# Patient Record
Sex: MALE
[De-identification: names, ages, dates, MRNs, and addresses within clinical notes are randomized per-mention and may not be internally consistent; named-entity substitution may affect disease eponyms.]

---

## 2018-10-16 ENCOUNTER — HOSPITAL ENCOUNTER (OUTPATIENT)
Dept: HOSPITAL 88 - OR | Age: 63
Discharge: HOME | End: 2018-10-16
Attending: OPHTHALMOLOGY
Payer: COMMERCIAL

## 2018-10-16 VITALS — SYSTOLIC BLOOD PRESSURE: 125 MMHG | DIASTOLIC BLOOD PRESSURE: 75 MMHG

## 2018-10-16 DIAGNOSIS — H11.052: Primary | ICD-10-CM

## 2018-10-16 PROCEDURE — 65426 REMOVAL OF EYE LESION: CPT

## 2018-10-16 PROCEDURE — 88305 TISSUE EXAM BY PATHOLOGIST: CPT

## 2018-10-18 NOTE — OPERATIVE REPORT
DATE OF PROCEDURE:  October 16, 2018 

 

PREOPERATIVE DIAGNOSES

1. 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

2. 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



POSTOPERATIVE DIAGNOSES

1. 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

1. 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



OPERATIONS PERFORMED

1. Pterygium excision left eye nasal and temporal.

2. Amniotic membrane graft placement left eye nasal temporal, left eye.

3. Superficial keratectomy left eye.

4. Mitomycin-C 0.25 mg per mL 60 seconds on the conjunctiva nasal and 

temporal left eye.



ANESTHESIA:  MAC.



COMPLICATIONS:  None.



DETAILS OF PROCEDURE:  Patient was taken to the operating room where he had 

a small amount of tetracaine drops placed in the eye. The patient's eye was 

prepped and draped in the usual sterile opthalmic way. A lid speculum was 

placed in the left eye. A 6-0 sterile stay suture was placed at the 12 and 

6 o'clock position at the limbus. The pterygium was localized nasally and 

was carefully cut away from the sclera and cornea using 0.12 forceps and 

Jarvis scissors. Wet-Field cautery was used to control any bleeding. The 

same thing was done to the large temporal pterygium. Once this was removed, 

Wet-Field cautery was used to control any bleeding. Both were sent to 

pathology for evaluation. A bur was used to perform a superficial 

keratectomy to remove the residual debris on the nasal and temporal part of 

the cornea. Once this was done, a cottonoid was soaked in mitomycin 0.25 mg 

per mL and placed at the edge of the conjunctiva on the nasal and temporal 

aspect of the eye for 60 seconds. Copious amounts of BSS solution were used 

to irrigate this off. An amniotic membrane measured at 10 x 15 mm for the 

nasal placement and 10 x 20 on the temporal aspect of the conjunctiva. This 

was secured using fibrin and thrombin glue. Excess glue and tissue were cut 

to size using 0.12 forceps and Jarvis scissors. The amniotic membrane 

graft serial number is [id number]. Patient was sent to the recovery 

room with a patch, Boyd shield and Maxitrol ointment. Patient tolerated the 

procedure well and will be seen in my office tomorrow.









DD:  10/18/2018 12:02

DT:  10/18/2018 15:37

Job#:  [id number]